# Patient Record
Sex: MALE | Race: WHITE | Employment: FULL TIME | URBAN - METROPOLITAN AREA
[De-identification: names, ages, dates, MRNs, and addresses within clinical notes are randomized per-mention and may not be internally consistent; named-entity substitution may affect disease eponyms.]

---

## 2024-06-16 ENCOUNTER — HOSPITAL ENCOUNTER (OUTPATIENT)
Dept: MRI IMAGING | Facility: HOSPITAL | Age: 24
End: 2024-06-16

## 2024-06-16 ENCOUNTER — HOSPITAL ENCOUNTER (OUTPATIENT)
Dept: MRI IMAGING | Facility: HOSPITAL | Age: 24
Discharge: HOME | End: 2024-06-16

## 2024-06-16 DIAGNOSIS — Z15.09 HEREDITARY LEIOMYOMATOSIS AND RENAL CELL CANCER (HLRCC): ICD-10-CM

## 2024-06-16 DIAGNOSIS — Z15.09 GENETIC SUSCEPTIBILITY TO OTHER MALIGNANT NEOPLASM: ICD-10-CM

## 2024-06-16 PROCEDURE — A9575 INJ GADOTERATE MEGLUMI 0.1ML: HCPCS

## 2024-06-16 PROCEDURE — 74183 MRI ABD W/O CNTR FLWD CNTR: CPT

## 2024-06-16 RX ORDER — GADOTERATE MEGLUMINE 376.9 MG/ML
15 INJECTION INTRAVENOUS
Status: COMPLETED | OUTPATIENT
Start: 2024-06-16 | End: 2024-06-16

## 2024-06-16 RX ADMIN — GADOTERATE MEGLUMINE 13 ML: 376.9 INJECTION INTRAVENOUS at 11:31:00

## 2025-06-02 ENCOUNTER — OFFICE VISIT (OUTPATIENT)
Dept: FAMILY MEDICINE CLINIC | Facility: CLINIC | Age: 25
End: 2025-06-02
Payer: COMMERCIAL

## 2025-06-02 VITALS
RESPIRATION RATE: 16 BRPM | TEMPERATURE: 98 F | HEART RATE: 68 BPM | BODY MASS INDEX: 21.55 KG/M2 | SYSTOLIC BLOOD PRESSURE: 98 MMHG | HEIGHT: 69.75 IN | WEIGHT: 148.81 LBS | DIASTOLIC BLOOD PRESSURE: 60 MMHG

## 2025-06-02 DIAGNOSIS — Z13.6 SCREENING FOR CARDIOVASCULAR CONDITION: ICD-10-CM

## 2025-06-02 DIAGNOSIS — Z82.79 FAMILY HISTORY OF CONGENITAL OR GENETIC CONDITION: ICD-10-CM

## 2025-06-02 DIAGNOSIS — Z00.00 ENCOUNTER FOR ROUTINE HISTORY AND PHYSICAL EXAMINATION: Primary | ICD-10-CM

## 2025-06-02 PROCEDURE — 99385 PREV VISIT NEW AGE 18-39: CPT | Performed by: STUDENT IN AN ORGANIZED HEALTH CARE EDUCATION/TRAINING PROGRAM

## 2025-06-02 NOTE — PROGRESS NOTES
The following individual(s) verbally consented to be recorded using ambient AI listening technology and understand that they can each withdraw their consent to this listening technology at any point by asking the clinician to turn off or pause the recording:    Patient name: Horace Bennett  Additional names:

## 2025-06-02 NOTE — PROGRESS NOTES
[facility] [place]     CC: yearly exam     HPI: Horace Bennett is 24 year old male here for a yearly physical.      History of Present Illness  Horace Bennett is a 24 year old male who presents for an established care visit and routine physical exam.    He has a hereditary kidney condition requiring annual MRI monitoring, with no current symptoms or kidney function issues. He plans to schedule his next MRI in [place]. Previous MRIs have shown no abnormalities.    He experienced chest soreness for two months, resolving two weeks ago, with associated slight shortness of breath. An EKG ruled out cardiac issues. He reduced caffeine intake and noted symptom resolution after leaving a stressful job.    He exercises at the gym twice weekly, primarily using the treadmill, and has reduced weightlifting due to previous chest soreness. He consumes approximately three alcoholic drinks per week and does not use tobacco. He is overdue for dental and eye exams, with the last dental visit a year ago and acknowledging the need for further dental work.    Problem list today  1. Healthcare maintenance.  Exercise - gym twice weekly, goes on the treadmill.  Sunscreen -.  Caffeine - not much.  Advanced directives-  [facility] in [place], gets yearly MRI's.      Last Physical exam 06/02/25      SH: reviewed     FH: reviewed     Social History     Social History Narrative    Not on file        ROS: full 10 point review of systems done and otherwise negative.  Denies any headaches, vision changes, congestion, sore throat, tinnitus, dizziness, cough, SOB, CP, palpitations, difficulty swallowing, n/v, c/d, blood in stools, urinary symptoms, LE edema, numbness/tingling in toes, focal weakness, joint pains, cold/heat intolerance or skin changes.     Healthcare Maintenance:  Health Maintenance   Topic Date Due    Annual Physical  Never done    HPV Vaccines (1 - Male 3-dose series) Never done     COVID-19 Vaccine (4 - 2024-25 season) 09/01/2024    Annual Depression Screening  Never done    Influenza Vaccine (Season Ended) 10/01/2025    DTaP,Tdap,and Td Vaccines (2 - Td or Tdap) 09/28/2031    Pneumococcal Vaccine: Birth to 50yrs  Aged Out    Meningococcal B Vaccine  Aged Out       Health Habits:  Smoking. No  Alcohol Use. 1-3 drinks/week  Dental Exam. Due, pt to follow up  Eye Exam. UTD       PE:  Vital Signs    06/02/25 1321   BP: 98/60   Pulse: 68   Resp: 16   Temp: 97.9 °F (36.6 °C)     Wt Readings from Last 3 Encounters:   06/02/25 148 lb 12.8 oz (67.5 kg)     Body mass index is 21.5 kg/m².     General: Comfortable, pleasant, NAD, appears stated age  HEENT.  NC/AT, no conjunctival injection, TMs clear, oropharynx without erythema or exudate, neck supple, no LAD or thyromegaly  CV.  RRR, no m/r/g  Pulm. CTAB, no W/R/R, comfortable work of breathing, speaks in full sentences  Abdomen. Soft, nt, nd  .  deferred  Extremities.  2+ DP pulses, no edema  Skin.  No concerning moles      Physical Exam         No visits with results within 4 Week(s) from this visit.   Latest known visit with results is:   No results found for any previous visit.        A/P: Horace Bennett is 24 year old yo male     1. Healthcare Maintenance. Discussed eye exam, dentist visit q6 months, sunscreen, exercise at least 5x/week, 30 minutes daily, and limiting caffeine intake.     Assessment & Plan  Hereditary kidney condition  Hereditary kidney condition stable, no impact on daily life. Previous tests show normal renal function.  - Order blood work including renal function panel.    General Health Maintenance  Discussed HPV vaccine importance for cancer and wart prevention.  - Check HPV vaccination records.  - Consider HPV vaccination if not previously administered.    Encounter Medications[1]         [1]   No outpatient encounter medications on file as of 6/2/2025.     No facility-administered encounter medications on file as of  6/2/2025.

## 2025-06-04 PROBLEM — Z82.79 FAMILY HISTORY OF CONGENITAL OR GENETIC CONDITION: Status: ACTIVE | Noted: 2025-06-04

## 2025-08-27 ENCOUNTER — HOSPITAL ENCOUNTER (OUTPATIENT)
Dept: MRI IMAGING | Age: 25
Discharge: HOME | End: 2025-08-27
Attending: STUDENT IN AN ORGANIZED HEALTH CARE EDUCATION/TRAINING PROGRAM

## 2025-08-27 DIAGNOSIS — Z82.79 FAMILY HISTORY OF CONGENITAL OR GENETIC CONDITION: ICD-10-CM

## 2025-08-27 PROCEDURE — A9575 INJ GADOTERATE MEGLUMI 0.1ML: HCPCS | Performed by: STUDENT IN AN ORGANIZED HEALTH CARE EDUCATION/TRAINING PROGRAM

## 2025-08-27 PROCEDURE — 74183 MRI ABD W/O CNTR FLWD CNTR: CPT | Performed by: STUDENT IN AN ORGANIZED HEALTH CARE EDUCATION/TRAINING PROGRAM

## 2025-08-27 RX ORDER — GADOTERATE MEGLUMINE 376.9 MG/ML
15 INJECTION INTRAVENOUS
Status: COMPLETED | OUTPATIENT
Start: 2025-08-27 | End: 2025-08-27

## 2025-08-27 RX ADMIN — GADOTERATE MEGLUMINE 15 ML: 376.9 INJECTION INTRAVENOUS at 13:59:00
